# Patient Record
Sex: FEMALE | Race: OTHER | HISPANIC OR LATINO
[De-identification: names, ages, dates, MRNs, and addresses within clinical notes are randomized per-mention and may not be internally consistent; named-entity substitution may affect disease eponyms.]

---

## 2023-06-20 PROBLEM — Z00.00 ENCOUNTER FOR PREVENTIVE HEALTH EXAMINATION: Status: ACTIVE | Noted: 2023-06-20

## 2023-06-30 ENCOUNTER — APPOINTMENT (OUTPATIENT)
Dept: HEMATOLOGY ONCOLOGY | Facility: CLINIC | Age: 38
End: 2023-06-30
Payer: COMMERCIAL

## 2023-07-03 ENCOUNTER — RESULT REVIEW (OUTPATIENT)
Age: 38
End: 2023-07-03

## 2023-07-03 ENCOUNTER — APPOINTMENT (OUTPATIENT)
Dept: HEMATOLOGY ONCOLOGY | Facility: CLINIC | Age: 38
End: 2023-07-03
Payer: COMMERCIAL

## 2023-07-03 VITALS
OXYGEN SATURATION: 96 % | TEMPERATURE: 96.8 F | SYSTOLIC BLOOD PRESSURE: 94 MMHG | HEIGHT: 56.3 IN | DIASTOLIC BLOOD PRESSURE: 56 MMHG | HEART RATE: 72 BPM | BODY MASS INDEX: 31.17 KG/M2 | WEIGHT: 140.5 LBS | RESPIRATION RATE: 16 BRPM

## 2023-07-03 DIAGNOSIS — O99.019 ANEMIA COMPLICATING PREGNANCY, UNSPECIFIED TRIMESTER: ICD-10-CM

## 2023-07-03 DIAGNOSIS — Z34.90 ENCOUNTER FOR SUPERVISION OF NORMAL PREGNANCY, UNSPECIFIED, UNSPECIFIED TRIMESTER: ICD-10-CM

## 2023-07-03 DIAGNOSIS — E61.1 IRON DEFICIENCY: ICD-10-CM

## 2023-07-03 PROCEDURE — 36415 COLL VENOUS BLD VENIPUNCTURE: CPT

## 2023-07-03 PROCEDURE — 99205 OFFICE O/P NEW HI 60 MIN: CPT | Mod: 25

## 2023-07-03 RX ORDER — PNV/FERROUS SULFATE/FOLIC ACID 27-<0.5MG
TABLET ORAL
Refills: 0 | Status: ACTIVE | COMMUNITY

## 2023-07-03 RX ORDER — FERROUS SULFATE 325(65) MG
325 (65 FE) TABLET ORAL
Refills: 0 | Status: ACTIVE | COMMUNITY

## 2023-07-03 RX ORDER — ACETAMINOPHEN 160 MG/5ML
250 LIQUID ORAL
Refills: 0 | Status: ACTIVE | COMMUNITY

## 2023-07-03 NOTE — HISTORY OF PRESENT ILLNESS
[de-identified] : Ms. Rod is a 37 year old G P female 35 weeks pregnant that presents for initial consultation for VIDYA of pregnancy referred by midwives at [facility]. \par No c/o of dizziness or change in vision, No CP or SOB.  Pt reports fatigue during and before pregnancy, headaches, craving ice, and nausea from the iron supplements. Otherwise feeling well.\par \par Due Date: 2023\par \par Iron Supplementation: Iron 325mg daily and Vitamin-c 250mg daily started \par \par Age of Menarche: 17\par OCP: Has used injection, implants and pills. Unsure of names\par HRT:No\par GP: \par \par PMH: None\par \par Family HX: None\par \par Social Hx:\par Smoking: No\par ETOH: socially prior to pregnancy\par Illicit drug use: No\par Work: Currently unemployed\par \par \par \par \par \par 23\par hgb 12.6\par ferritin 14\par \par 23 \par hgb 11.7\par ferritin 14\par \par 23\par hgb 11.8 \par ferritin 10\par \par

## 2023-07-03 NOTE — PHYSICAL EXAM
[Fully active, able to carry on all pre-disease performance without restriction] : Status 0 - Fully active, able to carry on all pre-disease performance without restriction [Normal] : affect appropriate [de-identified] : gravid abdomen

## 2023-07-03 NOTE — CONSULT LETTER
[Dear  ___] : Dear ~TAVO, [Consult Letter:] : I had the pleasure of evaluating your patient, [unfilled]. [Please see my note below.] : Please see my note below. [Consult Closing:] : Thank you very much for allowing me to participate in the care of this patient.  If you have any questions, please do not hesitate to contact me. [Sincerely,] : Sincerely, [FreeTextEntry3] : Radha Zuniga DO, FACJAIDA, FACP\par Medical Oncology and Hematology\par [facility]\par

## 2023-07-03 NOTE — ASSESSMENT
[FreeTextEntry1] : #Anemia of pregnancy \par We have reviewed the diagnosis and causes of anemia. We have discussed that physiologic anemia of pregnancy and iron deficiency are the two most common causes of anemia in pregnant women. Will check CBC with diff, CMP, LDH, Haptoglobin, retic, iron studies with ferritin, B12/Folate.\par Previously taking liquid iron. Ferritin now 10 in a pregnant woman, hgb 11.8. Would recommend parenteral iron in the form of venofer 200 mg administered x 5 doses.\par We have reviewed the side effect of venofer to include but are not limited to infusion reaction, headaches, nausea.\par Clinical studies indicated adverse maternal and fetal outcomes with iron deficiency anemia. Adverse maternal outcomes included placental abruption,  birth, increased risk of postpartum hemorrhage, increased maternal morbidity.  Neonates born to mothers with iron deficiency had higher rate of fetal distress, small birth weight, increased risk of autism, ADD and intellectual disability.\par Patient didn't adequately respond to PO iron, therefore she has an indication for parenteral iron infusion.\par \par #Currently Pregnant\par Follows with Midwives at [facility]\par \par RTC in 4 months with cbc with diff, cmp, iron, ferritin, b12, folate, ESR/CRP, TSH\par

## 2023-07-31 ENCOUNTER — TRANSCRIPTION ENCOUNTER (OUTPATIENT)
Age: 38
End: 2023-07-31

## 2023-11-06 ENCOUNTER — APPOINTMENT (OUTPATIENT)
Dept: HEMATOLOGY ONCOLOGY | Facility: CLINIC | Age: 38
End: 2023-11-06